# Patient Record
Sex: MALE | Race: WHITE
[De-identification: names, ages, dates, MRNs, and addresses within clinical notes are randomized per-mention and may not be internally consistent; named-entity substitution may affect disease eponyms.]

---

## 2017-05-15 ENCOUNTER — HOSPITAL ENCOUNTER (OUTPATIENT)
Dept: HOSPITAL 53 - M OPP | Age: 77
Discharge: HOME | End: 2017-05-15
Attending: INTERNAL MEDICINE
Payer: MEDICARE

## 2017-05-15 VITALS — HEIGHT: 68 IN | BODY MASS INDEX: 27.28 KG/M2 | WEIGHT: 180 LBS

## 2017-05-15 VITALS — SYSTOLIC BLOOD PRESSURE: 138 MMHG | DIASTOLIC BLOOD PRESSURE: 69 MMHG

## 2017-05-15 DIAGNOSIS — D12.5: ICD-10-CM

## 2017-05-15 DIAGNOSIS — M19.90: ICD-10-CM

## 2017-05-15 DIAGNOSIS — E03.9: ICD-10-CM

## 2017-05-15 DIAGNOSIS — Z86.010: ICD-10-CM

## 2017-05-15 DIAGNOSIS — Z79.899: ICD-10-CM

## 2017-05-15 DIAGNOSIS — K57.30: ICD-10-CM

## 2017-05-15 DIAGNOSIS — Z12.11: Primary | ICD-10-CM

## 2017-05-15 DIAGNOSIS — H81.09: ICD-10-CM

## 2017-05-15 DIAGNOSIS — K64.0: ICD-10-CM

## 2017-05-15 DIAGNOSIS — D37.4: ICD-10-CM

## 2017-05-15 NOTE — ROOR
________________________________________________________________________________

Patient Name: Carlos Eduardo Christianson           Procedure Date: 5/15/2017 8:11 AM

MRN: [id number]                          Account Number: [id number]

YOB: 1940               Age: 76

Room: [facility]                            Gender: Male

Note Status: Finalized                 

________________________________________________________________________________

 

Procedure:           Colonoscopy to Cecum + Cold Snare Polypectomy

Indications:         High risk colon cancer surveillance: Personal history of 

                     colonic polyps, High risk colon cancer surveillance: 

                     Personal history of adenoma with villous component

Providers:           Allan Stanley MD

Referring MD:        Arturo Bishop MD

Requesting Provider: 

Medicines:           Monitored Anesthesia Care

Complications:       No immediate complications.

________________________________________________________________________________

Procedure:           Pre-Anesthesia Assessment:

                     - The heart rate, respiratory rate, oxygen saturations, 

                     blood pressure, adequacy of pulmonary ventilation, and 

                     response to care were monitored throughout the procedure.

                     The Colonoscope was introduced through the anus and 

                     advanced to the cecum, identified by appendiceal orifice 

                     and ileocecal valve. The colonoscopy was performed 

                     without difficulty. The patient tolerated the procedure 

                     well. The quality of the bowel preparation was good.

                                                                                

Findings:

     The perianal and digital rectal examinations were normal.

     Non-bleeding internal hemorrhoids were found during retroflexion. The 

     hemorrhoids were small and Grade I (internal hemorrhoids that do not 

     prolapse).

     Scattered small-mouthed diverticula were found in the recto-sigmoid 

     colon, sigmoid colon and descending colon.

     A medium polyp was found at 35 cm proximal to the anus. The polyp was 

     sessile. The polyp was removed with a cold snare. Resection and retrieval 

     were complete.

     The exam was otherwise without abnormality on direct and retroflexion 

     views.

                                                                                

Impression:          - Non-bleeding internal hemorrhoids.

                     - Diverticulosis in the recto-sigmoid colon, in the 

                     sigmoid colon and in the descending colon.

                     - One medium polyp at 35 cm proximal to the anus, removed 

                     with a cold snare. Resected and retrieved.

                     - The examination was otherwise normal on direct and 

                     retroflexion views.

                     - The exam was otherwise normal to the cecum.

Recommendation:      - Patient has a contact number available for emergencies. 

                     The signs and symptoms of potential delayed complications 

                     were discussed with the patient. Return to normal 

                     activities tomorrow. Written discharge instructions were 

                     provided to the patient.

                     - High fiber diet.

                     - Discharge patient to home.

                     - Continue present medications.

                     - Await pathology results.

                     - Telephone GI clinic for pathology results in 1 week.

                     - Repeat colonoscopy in 5 years for surveillance based on 

                     pathology results.

                     - Return to referring physician.

                     - The findings and recommendations were discussed with 

                     the patient's family.

                                                                                

 

Allan Stanley MD

____________________

Allan Stanley MD

5/15/2017 8:41:40 AM

This report has been signed electronically.

Number of Addenda: 0

 

Note Initiated On: 5/15/2017 8:11 AM

Estimated Blood Loss:

     Estimated blood loss: none.

## 2017-06-07 ENCOUNTER — HOSPITAL ENCOUNTER (OUTPATIENT)
Dept: HOSPITAL 53 - M LAB REF | Age: 77
End: 2017-06-07
Attending: FAMILY MEDICINE
Payer: MEDICARE

## 2017-06-07 DIAGNOSIS — M25.50: Primary | ICD-10-CM

## 2017-12-01 ENCOUNTER — HOSPITAL ENCOUNTER (OUTPATIENT)
Dept: HOSPITAL 53 - M LAB REF | Age: 77
End: 2017-12-01
Attending: NURSE PRACTITIONER
Payer: MEDICARE

## 2017-12-01 DIAGNOSIS — M25.551: Primary | ICD-10-CM

## 2017-12-05 LAB
B BURGDOR IGG+IGM SER-ACNC: <0.91 ISR (ref 0–0.9)
B BURGDOR IGM SER IA-ACNC: <0.8 INDEX (ref 0–0.79)

## 2021-04-26 ENCOUNTER — HOSPITAL ENCOUNTER (OUTPATIENT)
Dept: HOSPITAL 53 - M LAB REF | Age: 81
End: 2021-04-26
Attending: FAMILY MEDICINE
Payer: MEDICARE

## 2021-04-26 DIAGNOSIS — Z01.818: Primary | ICD-10-CM

## 2021-04-26 LAB
APPEARANCE UR: CLEAR
APTT BLD: 25 SECONDS (ref 24.2–38.5)
BACTERIA UR QL AUTO: NEGATIVE
BILIRUB UR QL STRIP.AUTO: NEGATIVE
FERRITIN SERPL-MCNC: 99 NG/ML (ref 26–388)
GLUCOSE UR QL STRIP.AUTO: NEGATIVE MG/DL
HGB UR QL STRIP.AUTO: NEGATIVE
INR PPP: 1.06
IRON SATN MFR SERPL: 25.2 % (ref 19.7–50)
IRON SERPL-MCNC: 79 UG/DL (ref 65–175)
KETONES UR QL STRIP.AUTO: NEGATIVE MG/DL
LEUKOCYTE ESTERASE UR QL STRIP.AUTO: NEGATIVE
MUCOUS THREADS URNS QL MICRO: (no result)
NITRITE UR QL STRIP.AUTO: NEGATIVE
PH UR STRIP.AUTO: 5 UNITS (ref 5–9)
PROT UR QL STRIP.AUTO: NEGATIVE MG/DL
PROTHROMBIN TIME: 14 SECONDS (ref 12.5–14.3)
RBC # UR AUTO: 0 /HPF (ref 0–3)
SP GR UR STRIP.AUTO: 1.02 (ref 1–1.03)
SQUAMOUS #/AREA URNS AUTO: 0 /HPF (ref 0–6)
TIBC SERPL-MCNC: 314 UG/DL (ref 250–450)
UROBILINOGEN UR QL STRIP.AUTO: 0.2 MG/DL (ref 0–2)
WBC #/AREA URNS AUTO: 0 /HPF (ref 0–3)

## 2021-11-17 ENCOUNTER — HOSPITAL ENCOUNTER (OUTPATIENT)
Dept: HOSPITAL 53 - M LAB REF | Age: 81
End: 2021-11-17
Attending: FAMILY MEDICINE
Payer: MEDICARE

## 2021-11-17 DIAGNOSIS — Z51.81: ICD-10-CM

## 2021-11-17 DIAGNOSIS — Z01.818: Primary | ICD-10-CM

## 2021-11-17 DIAGNOSIS — Z79.899: ICD-10-CM

## 2021-11-17 LAB
APPEARANCE UR: CLEAR
APTT BLD: 27.1 SECONDS (ref 25.9–37)
BACTERIA UR QL AUTO: NEGATIVE
BILIRUB UR QL STRIP.AUTO: NEGATIVE
FERRITIN SERPL-MCNC: 79 NG/ML (ref 26–388)
GLUCOSE UR QL STRIP.AUTO: NEGATIVE MG/DL
HGB UR QL STRIP.AUTO: NEGATIVE
INR PPP: 0.98
IRON SATN MFR SERPL: 53.6 % (ref 19.7–50)
IRON SERPL-MCNC: 181 UG/DL (ref 65–175)
KETONES UR QL STRIP.AUTO: NEGATIVE MG/DL
LEUKOCYTE ESTERASE UR QL STRIP.AUTO: NEGATIVE
NITRITE UR QL STRIP.AUTO: NEGATIVE
PH UR STRIP.AUTO: 6 UNITS (ref 5–9)
PROT UR QL STRIP.AUTO: NEGATIVE MG/DL
PROTHROMBIN TIME: 13.4 SECONDS (ref 12.7–14.5)
RBC # UR AUTO: 0 /HPF (ref 0–3)
SP GR UR STRIP.AUTO: 1.02 (ref 1–1.03)
SQUAMOUS #/AREA URNS AUTO: 0 /HPF (ref 0–6)
TIBC SERPL-MCNC: 338 UG/DL (ref 250–450)
UROBILINOGEN UR QL STRIP.AUTO: 0.2 MG/DL (ref 0–2)
WBC #/AREA URNS AUTO: 0 /HPF (ref 0–3)

## 2022-11-13 ENCOUNTER — HOSPITAL ENCOUNTER (OUTPATIENT)
Dept: HOSPITAL 53 - M LABSMTC | Age: 82
End: 2022-11-13
Attending: ANESTHESIOLOGY
Payer: MEDICARE

## 2022-11-13 DIAGNOSIS — Z20.822: ICD-10-CM

## 2022-11-13 DIAGNOSIS — Z01.812: Primary | ICD-10-CM

## 2022-11-16 ENCOUNTER — HOSPITAL ENCOUNTER (OUTPATIENT)
Dept: HOSPITAL 53 - M OPP | Age: 82
Discharge: HOME | End: 2022-11-16
Attending: INTERNAL MEDICINE
Payer: MEDICARE

## 2022-11-16 VITALS — WEIGHT: 177 LBS | HEIGHT: 67 IN | BODY MASS INDEX: 27.78 KG/M2

## 2022-11-16 VITALS — SYSTOLIC BLOOD PRESSURE: 167 MMHG | DIASTOLIC BLOOD PRESSURE: 78 MMHG

## 2022-11-16 DIAGNOSIS — Z79.899: ICD-10-CM

## 2022-11-16 DIAGNOSIS — Z12.11: Primary | ICD-10-CM

## 2022-11-16 DIAGNOSIS — Z86.010: ICD-10-CM

## 2022-11-16 DIAGNOSIS — K64.0: ICD-10-CM

## 2022-11-16 DIAGNOSIS — H81.09: ICD-10-CM

## 2022-11-16 PROCEDURE — 88305 TISSUE EXAM BY PATHOLOGIST: CPT

## 2022-11-16 PROCEDURE — 45385 COLONOSCOPY W/LESION REMOVAL: CPT

## 2023-04-28 ENCOUNTER — HOSPITAL ENCOUNTER (OUTPATIENT)
Dept: HOSPITAL 53 - M SFHCDERM | Age: 83
End: 2023-04-28
Attending: NURSE PRACTITIONER
Payer: MEDICARE

## 2023-04-28 DIAGNOSIS — Z48.89: Primary | ICD-10-CM

## 2023-07-18 ENCOUNTER — HOSPITAL ENCOUNTER (OUTPATIENT)
Dept: HOSPITAL 53 - M LAB REF | Age: 83
End: 2023-07-18
Attending: FAMILY MEDICINE
Payer: MEDICARE

## 2023-07-18 DIAGNOSIS — M25.50: Primary | ICD-10-CM

## 2024-02-20 ENCOUNTER — HOSPITAL ENCOUNTER (OUTPATIENT)
Dept: HOSPITAL 53 - M RAD | Age: 84
End: 2024-02-20
Attending: FAMILY MEDICINE
Payer: MEDICARE

## 2024-02-20 DIAGNOSIS — R13.10: Primary | ICD-10-CM

## 2024-06-24 ENCOUNTER — HOSPITAL ENCOUNTER (OUTPATIENT)
Dept: HOSPITAL 53 - M PLAIMG | Age: 84
End: 2024-06-24
Attending: FAMILY MEDICINE
Payer: MEDICARE

## 2024-06-24 DIAGNOSIS — N28.1: ICD-10-CM

## 2024-06-24 DIAGNOSIS — R10.2: Primary | ICD-10-CM

## 2024-06-24 PROCEDURE — 74178 CT ABD&PLV WO CNTR FLWD CNTR: CPT

## 2024-09-16 ENCOUNTER — HOSPITAL ENCOUNTER (OUTPATIENT)
Dept: HOSPITAL 53 - M WUC | Age: 84
End: 2024-09-16
Attending: REGISTERED NURSE
Payer: MEDICARE

## 2024-09-16 DIAGNOSIS — J10.08: Primary | ICD-10-CM
